# Patient Record
Sex: MALE | Race: WHITE | URBAN - METROPOLITAN AREA
[De-identification: names, ages, dates, MRNs, and addresses within clinical notes are randomized per-mention and may not be internally consistent; named-entity substitution may affect disease eponyms.]

---

## 2017-09-19 ENCOUNTER — ALLSCRIPTS OFFICE VISIT (OUTPATIENT)
Dept: OTHER | Facility: OTHER | Age: 82
End: 2017-09-19

## 2017-09-21 ENCOUNTER — GENERIC CONVERSION - ENCOUNTER (OUTPATIENT)
Dept: OTHER | Facility: OTHER | Age: 82
End: 2017-09-21

## 2017-10-26 NOTE — PROGRESS NOTES
Assessment  1  Abdominal cramps (789 00) (R10 9)   2  Hepatic encephalopathy (572 2) (K72 90)   3  Portal hypertension (572 3) (K76 6)    Plan  Portal hypertension    · EGD; Status:Hold For - Scheduling; Requested OWY:[id number];    Perform:[facility]; Due:[id number];Ordered; For:Portal hypertension; Ordered By:Anselmo Montes De Oca; Unlinked    · Warfarin Sodium 6 MG Oral Tablet   Dispense: 30 Days ; #:30 TABS; Refill: 0; AMAURI = N; Record; Last Updated By: Niels Zarate; 9/19/2017 10:20:35 AM    Discussion/Summary  Discussion Summary:   Is an 14-year-old male with pre-sinusoidal portal hypertension and hepatic encephalopathy which is intermittent  He went back on his lactulose and his wife reports that this is helping his mental status  He's also struggling with memory loss and early dementia  for hepatic encephalopathy currently he has no asterixis he we'll continue the lactulose  for varices screening he had grade 1 varices a year ago is due for this we will do this  he does not he needs screening because he has pre-sinusoidal portal hypertension  he clearly has early memory loss and dementia which confounds everything  visit was over 25 minutes over half of which was spent counseling the patient regarding his lactulose  Counseling Documentation With Imm: The patient was counseled regarding diagnostic results,-- prognosis,-- risks and benefits of treatment options  History of Present Illness  HPI: He is an 14-year-old male with dementia and pre-sinusoidal portal hypertension  He does have hepatic encephalopathy and this is become a problem recently with sleeping a lot during the day was having increasing memory difficulties with the lactulose is helped him  He clearly has thought sloughing and his wife does all the talking in the room  He's not fully able to follow complete instructions at times  He's had no melena or hematochezia according to her he endoscopy with trace varices year ago   He has no nausea vomiting fevers headaches  He's eating okay and he is still walking the dog's with his son at night  Does have a normal sleep wake cycle and a night cycle      Review of Systems  Complete-Male GI Adult:   Constitutional: No fever or chills, feels well, no tiredness, no recent weight gain or weight loss  Eyes: No complaints of eye pain, no red eyes, no discharge from eyes, no itchy eyes  ENT: no complaints of earache, no hearing loss, no nosebleeds, no nasal discharge, no sore throat, no hoarseness  Cardiovascular: No complaints of slow heart rate, no fast heart rate, no chest pain, no palpitations, no leg claudication, no lower extremity  Respiratory: No complaints of shortness of breath, no wheezing, no cough, no SOB on exertion, no orthopnea or PND  Gastrointestinal: as noted in HPI  Genitourinary: No complaints of dysuria, no incontinence, no hesitancy, no nocturia, no genital lesion, no testicular pain  Musculoskeletal: No complaints of arthralgia, no myalgias, no joint swelling or stiffness, no limb pain or swelling  Integumentary: No complaints of skin rash or skin lesions, no itching, no skin wound, no dry skin  Neurological: No compliants of headache, no confusion, no convulsions, no numbness or tingling, no dizziness or fainting, no limb weakness, no difficulty walking  Psychiatric: Is not suicidal, no sleep disturbances, no anxiety or depression, no change in personality, no emotional problems  Endocrine: No complaints of proptosis, no hot flashes, no muscle weakness, no erectile dysfunction, no deepening of the voice, no feelings of weakness  Hematologic/Lymphatic: No complaints of swollen glands, no swollen glands in the neck, does not bleed easily, no easy bruising  ROS Reviewed:   ROS reviewed  Active Problems  1  Abdominal cramps (789 00) (R10 9)   2  BPH without urinary obstruction (600 00) (N40 0)   3  Edema, lower extremity (782 3) (R60 0)   4   Hepatic encephalopathy (572 2) (K72 90)   5  Memory change (780 93) (R41 3)   6  Portal hypertension (572 3) (K76 6)   7  Pulmonary embolus (415 19) (I26 99)   8  Sciatica of right side (724 3) (M54 31)    Past Medical History  Active Problems And Past Medical History Reviewed: The active problems and past medical history were reviewed and updated today  Surgical History  1  History of Hernia Repair  Surgical History Reviewed: The surgical history was reviewed and updated today  Family History  Mother    1  Family history of CAD, multiple vessel  Father    2  Family history of CAD, multiple vessel  Family History Reviewed: The family history was reviewed and updated today  Social History   · Former smoker (B71 02) (T24 904)  Social History Reviewed: The social history was reviewed and updated today  The social history was reviewed and is unchanged  Current Meds   1  Calcium TABS; Therapy: (Recorded:16Roe0024) to Recorded   2  Multivitamin TABS; Therapy: (Midge He) to Recorded   3  Namzaric CP24;   Therapy: (Recorded:00Ele7780) to Recorded   4  Omega 3 CAPS; Therapy: (Midge He) to Recorded   5  Probiotic Oral Capsule; Therapy: (Midge He) to Recorded   6  Red Yeast Rice CAPS; Therapy: (Midge He) to Recorded   7  Spironolactone 25 MG Oral Tablet; Therapy: 21Jxg6353 to Recorded   8  Warfarin Sodium 4 MG Oral Tablet; Therapy: 65OVU0073 to Recorded   9  Warfarin Sodium 6 MG Oral Tablet; Therapy: 34Nxh5992 to Recorded   10  Xifaxan 550 MG Oral Tablet; TAKE 1 TABLET TWICE DAILY; Therapy: 21DUR4568 to (Last Rx:[id number])  Requested for: 46KFF1723 Ordered  Medication List Reviewed: The medication list was reviewed and updated today  Allergies  1   No Known Drug Allergies    Vitals  Vital Signs    Recorded: 42Mbb2157 10:22AM   Heart Rate 80   Systolic 92   Diastolic 64   Height 5 ft 11 in   Weight 156 lb 6 08 oz   BMI Calculated 21 81   BSA Calculated 1 9     Physical Exam    Constitutional   General appearance: No acute distress, well appearing and well nourished  Eyes   Conjunctiva and lids: No swelling, erythema, or discharge  Pupils and irises: Equal, round and reactive to light  Ears, Nose, Mouth, and Throat   External inspection of ears and nose: Normal     Otoscopic examination: Tympanic membrance translucent with normal light reflex  Canals patent without erythema  Nasal mucosa, septum, and turbinates: Normal without edema or erythema  Oropharynx: Normal with no erythema, edema, exudate or lesions  Pulmonary   Respiratory effort: No increased work of breathing or signs of respiratory distress  Auscultation of lungs: Clear to auscultation, equal breath sounds bilaterally, no wheezes, no rales, no rhonci  Cardiovascular   Palpation of heart: Normal PMI, no thrills  Auscultation of heart: Normal rate and rhythm, normal S1 and S2, without murmurs  Examination of extremities for edema and/or varicosities: Normal     Carotid pulses: Normal     Abdomen   Abdomen: Non-tender, no masses  Liver and spleen: No hepatomegaly or splenomegaly  Lymphatic   Palpation of lymph nodes in neck: No lymphadenopathy  Musculoskeletal   Gait and station: Normal     Digits and nails: Normal without clubbing or cyanosis  Inspection/palpation of joints, bones, and muscles: Normal     Skin   Skin and subcutaneous tissue: Normal without rashes or lesions  Neurologic   Cranial nerves: Cranial nerves 2-12 intact  Reflexes: 2+ and symmetric  Sensation: No sensory loss  Health Management  Hepatic encephalopathy   EGD; every 1 year; Last [id number]; Next Due: 58Dlt2921;  Overdue    Signatures   Electronically signed by : Campos Godinez MD; Sep 19 2017 10:35AM EST                       (Author)

## 2018-01-13 VITALS
HEIGHT: 71 IN | WEIGHT: 156.38 LBS | HEART RATE: 80 BPM | BODY MASS INDEX: 21.89 KG/M2 | SYSTOLIC BLOOD PRESSURE: 92 MMHG | DIASTOLIC BLOOD PRESSURE: 64 MMHG